# Patient Record
Sex: FEMALE | Race: WHITE | Employment: FULL TIME | URBAN - METROPOLITAN AREA
[De-identification: names, ages, dates, MRNs, and addresses within clinical notes are randomized per-mention and may not be internally consistent; named-entity substitution may affect disease eponyms.]

---

## 2017-02-03 PROBLEM — M79.661 PAIN IN RIGHT LOWER LEG: Status: ACTIVE | Noted: 2017-02-03

## 2017-08-14 ENCOUNTER — HOSPITAL ENCOUNTER (EMERGENCY)
Facility: HOSPITAL | Age: 57
Discharge: HOME | End: 2017-08-15
Attending: EMERGENCY MEDICINE
Payer: COMMERCIAL

## 2017-08-14 ENCOUNTER — APPOINTMENT (OUTPATIENT)
Dept: GENERAL RADIOLOGY | Facility: HOSPITAL | Age: 57
End: 2017-08-14
Payer: COMMERCIAL

## 2017-08-14 ENCOUNTER — APPOINTMENT (OUTPATIENT)
Dept: CT IMAGING | Facility: HOSPITAL | Age: 57
End: 2017-08-14
Attending: EMERGENCY MEDICINE
Payer: COMMERCIAL

## 2017-08-14 DIAGNOSIS — R42 VERTIGO: Primary | ICD-10-CM

## 2017-08-14 DIAGNOSIS — R07.89 CHEST PAIN, ATYPICAL: ICD-10-CM

## 2017-08-14 LAB
ALBUMIN SERPL-MCNC: 3.8 G/DL (ref 3.5–4.8)
ALP LIVER SERPL-CCNC: 108 U/L (ref 46–118)
ALT SERPL-CCNC: 24 U/L (ref 14–54)
AST SERPL-CCNC: 15 U/L (ref 15–41)
BASOPHILS # BLD AUTO: 0.04 X10(3) UL (ref 0–0.1)
BASOPHILS NFR BLD AUTO: 0.5 %
BILIRUB SERPL-MCNC: 0.5 MG/DL (ref 0.1–2)
BUN BLD-MCNC: 16 MG/DL (ref 8–20)
CALCIUM BLD-MCNC: 9.4 MG/DL (ref 8.3–10.3)
CHLORIDE: 105 MMOL/L (ref 101–111)
CO2: 27 MMOL/L (ref 22–32)
CREAT BLD-MCNC: 1.01 MG/DL (ref 0.55–1.02)
EOSINOPHIL # BLD AUTO: 0.12 X10(3) UL (ref 0–0.3)
EOSINOPHIL NFR BLD AUTO: 1.6 %
ERYTHROCYTE [DISTWIDTH] IN BLOOD BY AUTOMATED COUNT: 12.6 % (ref 11.5–16)
GLUCOSE BLD-MCNC: 99 MG/DL (ref 70–99)
HCT VFR BLD AUTO: 44.6 % (ref 34–50)
HGB BLD-MCNC: 15.2 G/DL (ref 12–16)
IMMATURE GRANULOCYTE COUNT: 0.02 X10(3) UL (ref 0–1)
IMMATURE GRANULOCYTE RATIO %: 0.3 %
LIPASE: 162 U/L (ref 73–393)
LYMPHOCYTES # BLD AUTO: 1.9 X10(3) UL (ref 0.9–4)
LYMPHOCYTES NFR BLD AUTO: 24.8 %
M PROTEIN MFR SERPL ELPH: 7.3 G/DL (ref 6.1–8.3)
MCH RBC QN AUTO: 30.5 PG (ref 27–33.2)
MCHC RBC AUTO-ENTMCNC: 34.1 G/DL (ref 31–37)
MCV RBC AUTO: 89.6 FL (ref 81–100)
MONOCYTES # BLD AUTO: 0.56 X10(3) UL (ref 0.1–0.6)
MONOCYTES NFR BLD AUTO: 7.3 %
NEUTROPHIL ABS PRELIM: 5.02 X10 (3) UL (ref 1.3–6.7)
NEUTROPHILS # BLD AUTO: 5.02 X10(3) UL (ref 1.3–6.7)
NEUTROPHILS NFR BLD AUTO: 65.5 %
PLATELET # BLD AUTO: 238 10(3)UL (ref 150–450)
POTASSIUM SERPL-SCNC: 3.8 MMOL/L (ref 3.6–5.1)
RBC # BLD AUTO: 4.98 X10(6)UL (ref 3.8–5.1)
RED CELL DISTRIBUTION WIDTH-SD: 41.2 FL (ref 35.1–46.3)
SODIUM SERPL-SCNC: 140 MMOL/L (ref 136–144)
TROPONIN: <0.046 NG/ML (ref ?–0.05)
WBC # BLD AUTO: 7.7 X10(3) UL (ref 4–13)

## 2017-08-14 PROCEDURE — 80053 COMPREHEN METABOLIC PANEL: CPT

## 2017-08-14 PROCEDURE — 93010 ELECTROCARDIOGRAM REPORT: CPT

## 2017-08-14 PROCEDURE — 83690 ASSAY OF LIPASE: CPT | Performed by: PHYSICIAN ASSISTANT

## 2017-08-14 PROCEDURE — 71010 XR CHEST AP PORTABLE  (CPT=71010): CPT

## 2017-08-14 PROCEDURE — 99285 EMERGENCY DEPT VISIT HI MDM: CPT

## 2017-08-14 PROCEDURE — 93005 ELECTROCARDIOGRAM TRACING: CPT

## 2017-08-14 PROCEDURE — 70450 CT HEAD/BRAIN W/O DYE: CPT | Performed by: EMERGENCY MEDICINE

## 2017-08-14 PROCEDURE — 71010 XR CHEST AP PORTABLE  (CPT=71010): CPT | Performed by: EMERGENCY MEDICINE

## 2017-08-14 PROCEDURE — 80053 COMPREHEN METABOLIC PANEL: CPT | Performed by: EMERGENCY MEDICINE

## 2017-08-14 PROCEDURE — 96372 THER/PROPH/DIAG INJ SC/IM: CPT

## 2017-08-14 PROCEDURE — 96374 THER/PROPH/DIAG INJ IV PUSH: CPT

## 2017-08-14 PROCEDURE — 84484 ASSAY OF TROPONIN QUANT: CPT | Performed by: EMERGENCY MEDICINE

## 2017-08-14 PROCEDURE — 84484 ASSAY OF TROPONIN QUANT: CPT

## 2017-08-14 PROCEDURE — 85025 COMPLETE CBC W/AUTO DIFF WBC: CPT | Performed by: EMERGENCY MEDICINE

## 2017-08-14 PROCEDURE — 85025 COMPLETE CBC W/AUTO DIFF WBC: CPT

## 2017-08-14 PROCEDURE — 70496 CT ANGIOGRAPHY HEAD: CPT | Performed by: EMERGENCY MEDICINE

## 2017-08-14 RX ORDER — DICYCLOMINE HYDROCHLORIDE 10 MG/ML
20 INJECTION INTRAMUSCULAR ONCE
Status: COMPLETED | OUTPATIENT
Start: 2017-08-14 | End: 2017-08-14

## 2017-08-14 RX ORDER — ONDANSETRON 2 MG/ML
4 INJECTION INTRAMUSCULAR; INTRAVENOUS ONCE
Status: COMPLETED | OUTPATIENT
Start: 2017-08-14 | End: 2017-08-14

## 2017-08-14 RX ORDER — MECLIZINE HYDROCHLORIDE 25 MG/1
25 TABLET ORAL ONCE
Status: COMPLETED | OUTPATIENT
Start: 2017-08-14 | End: 2017-08-14

## 2017-08-14 RX ORDER — LORAZEPAM 2 MG/ML
0.5 INJECTION INTRAMUSCULAR ONCE
Status: DISCONTINUED | OUTPATIENT
Start: 2017-08-14 | End: 2017-08-15

## 2017-08-15 VITALS
WEIGHT: 200 LBS | DIASTOLIC BLOOD PRESSURE: 62 MMHG | BODY MASS INDEX: 32 KG/M2 | SYSTOLIC BLOOD PRESSURE: 135 MMHG | HEART RATE: 67 BPM | RESPIRATION RATE: 14 BRPM | OXYGEN SATURATION: 98 %

## 2017-08-15 LAB
ATRIAL RATE: 56 BPM
ATRIAL RATE: 68 BPM
P AXIS: 54 DEGREES
P AXIS: 57 DEGREES
P-R INTERVAL: 140 MS
P-R INTERVAL: 142 MS
Q-T INTERVAL: 438 MS
Q-T INTERVAL: 450 MS
QRS DURATION: 76 MS
QRS DURATION: 86 MS
QTC CALCULATION (BEZET): 434 MS
QTC CALCULATION (BEZET): 465 MS
R AXIS: 28 DEGREES
R AXIS: 37 DEGREES
T AXIS: 34 DEGREES
T AXIS: 41 DEGREES
TROPONIN: <0.046 NG/ML (ref ?–0.05)
VENTRICULAR RATE: 56 BPM
VENTRICULAR RATE: 68 BPM

## 2017-08-15 RX ORDER — MECLIZINE HYDROCHLORIDE 25 MG/1
25 TABLET ORAL 3 TIMES DAILY PRN
Qty: 20 TABLET | Refills: 0 | Status: SHIPPED | OUTPATIENT
Start: 2017-08-15 | End: 2018-01-25 | Stop reason: ALTCHOICE

## 2017-08-15 RX ORDER — ONDANSETRON 4 MG/1
4 TABLET, ORALLY DISINTEGRATING ORAL EVERY 4 HOURS PRN
Qty: 20 TABLET | Refills: 0 | Status: SHIPPED | OUTPATIENT
Start: 2017-08-15 | End: 2017-08-20

## 2017-08-15 NOTE — ED INITIAL ASSESSMENT (HPI)
56YF c/c of dizziness and cp pt state she started feeling around 1700 pt state since then she started having vomiting and having chest pain radiating down her L arm

## 2017-08-15 NOTE — ED PROVIDER NOTES
Patient seen and examined with the physician assistant. Patient described to me positional vertigo so she would vomiting which started around 5 PM.  Intermittent chest pains lasting less than 1 minute that would occur every few minutes.   Constant jaw pain William Finch MD on 8/14/2017 at 21:59     Approved by: Lynn Oppenheim, MD            Xr Chest Ap Portable  (cpt=71010)    Result Date: 8/14/2017  PROCEDURE:  XR CHEST AP PORTABLE (CPT=71010)  TECHNIQUE:  AP chest radiograph was obtained.   COMPARISON:  [facility] An anterior communicating artery is seen. The branches of the anterior cerebral and middle cerebral arteries are unremarkable. A small right posterior  communicating artery is seen along with a small left posterior communicating artery.   The branches of discharge, that an emergency medical condition was not or was no longer present. There was no indication for further evaluation, treatment or admission on an emergency basis.   Comprehensive verbal and written discharge and follow-up instructions were prov

## 2017-08-15 NOTE — ED PROVIDER NOTES
Patient Seen in: [facility] Emergency Department    History   Patient presents with:  Chest Pain Angina (cardiovascular)    Stated Complaint: cp    HPI    CHIEF COMPLAINT: Nausea, vomiting, chest tightness, dizziness    HISTORY OF PRESENT ILLNESS: P D of right index finger   • AR (allergic rhinitis)    • Bronchospasm    • Esophageal reflux    • GERD     EGD 2007   • Osteoarthrosis, unspecified whether generalized or localized, unspecified site     right hip   • PONV (postoperative nausea and vomiting) stated complaint: cp  Other systems are as noted in HPI. Constitutional and vital signs reviewed. All other systems reviewed and negative except as noted above. PSFH elements reviewed from today and agreed except as otherwise stated in HPI.     Phy -----------         ------                     CBC W/ DIFFERENTIAL[[id number]]          Normal              Final result                 Please view results for these tests on the individual orders.    39 Yfn Bynum needed for Dizziness., Normal, Disp-20 tablet, R-0    ondansetron 4 MG Oral Tablet Dispersible  Take 1 tablet (4 mg total) by mouth every 4 (four) hours as needed for Nausea., Script printed, Disp-20 tablet, R-0

## 2017-12-22 PROCEDURE — 88377 M/PHMTRC ALYS ISHQUANT/SEMIQ: CPT | Performed by: RADIOLOGY

## 2017-12-22 PROCEDURE — 88305 TISSUE EXAM BY PATHOLOGIST: CPT | Performed by: RADIOLOGY

## 2017-12-22 PROCEDURE — 88360 TUMOR IMMUNOHISTOCHEM/MANUAL: CPT | Performed by: RADIOLOGY

## 2018-02-12 ENCOUNTER — TELEPHONE (OUTPATIENT)
Dept: MAMMOGRAPHY | Facility: HOSPITAL | Age: 58
End: 2018-02-12

## 2018-02-12 NOTE — TELEPHONE ENCOUNTER
Spoke with Tierra Kim regarding [place] Lymph Node mapping to be done in nuclear medicine department before breast surgery scheduled for 2/15/18 with Danna. Pt to be transported to [address] [place] [address] by [facility] through [employer].  Will make every effort

## 2018-02-15 ENCOUNTER — ANESTHESIA (OUTPATIENT)
Dept: SURGERY | Facility: HOSPITAL | Age: 58
End: 2018-02-15

## 2018-02-15 ENCOUNTER — HOSPITAL ENCOUNTER (OUTPATIENT)
Facility: HOSPITAL | Age: 58
Setting detail: OBSERVATION
Discharge: HOME | End: 2018-02-17
Attending: SURGERY | Admitting: SURGERY
Payer: COMMERCIAL

## 2018-02-15 ENCOUNTER — ANESTHESIA EVENT (OUTPATIENT)
Dept: SURGERY | Facility: HOSPITAL | Age: 58
End: 2018-02-15

## 2018-02-15 ENCOUNTER — HOSPITAL ENCOUNTER (OUTPATIENT)
Dept: NUCLEAR MEDICINE | Facility: HOSPITAL | Age: 58
Discharge: HOME | End: 2018-02-15
Attending: SURGERY
Payer: COMMERCIAL

## 2018-02-15 DIAGNOSIS — C50.919 INFILTRATING DUCTAL CARCINOMA OF FEMALE BREAST, UNSPECIFIED LATERALITY (HCC): ICD-10-CM

## 2018-02-15 DIAGNOSIS — C50.911 BILATERAL MALIGNANT NEOPLASM OF BREAST IN FEMALE, UNSPECIFIED ESTROGEN RECEPTOR STATUS, UNSPECIFIED SITE OF BREAST (HCC): Primary | ICD-10-CM

## 2018-02-15 DIAGNOSIS — C50.912 BILATERAL MALIGNANT NEOPLASM OF BREAST IN FEMALE, UNSPECIFIED ESTROGEN RECEPTOR STATUS, UNSPECIFIED SITE OF BREAST (HCC): Primary | ICD-10-CM

## 2018-02-15 PROCEDURE — 88307 TISSUE EXAM BY PATHOLOGIST: CPT | Performed by: SURGERY

## 2018-02-15 PROCEDURE — 88331 PATH CONSLTJ SURG 1 BLK 1SPC: CPT | Performed by: SURGERY

## 2018-02-15 PROCEDURE — 88305 TISSUE EXAM BY PATHOLOGIST: CPT | Performed by: SURGERY

## 2018-02-15 PROCEDURE — 88360 TUMOR IMMUNOHISTOCHEM/MANUAL: CPT | Performed by: SURGERY

## 2018-02-15 PROCEDURE — 07B50ZX EXCISION OF RIGHT AXILLARY LYMPHATIC, OPEN APPROACH, DIAGNOSTIC: ICD-10-PCS | Performed by: SURGERY

## 2018-02-15 PROCEDURE — 0HTV0ZZ RESECTION OF BILATERAL BREAST, OPEN APPROACH: ICD-10-PCS | Performed by: SURGERY

## 2018-02-15 PROCEDURE — 88332 PATH CONSLTJ SURG EA ADD BLK: CPT | Performed by: SURGERY

## 2018-02-15 PROCEDURE — A4216 STERILE WATER/SALINE, 10 ML: HCPCS

## 2018-02-15 PROCEDURE — 78195 LYMPH SYSTEM IMAGING: CPT | Performed by: SURGERY

## 2018-02-15 PROCEDURE — 0HUV0KZ SUPPLEMENT BILATERAL BREAST WITH NONAUTOLOGOUS TISSUE SUBSTITUTE, OPEN APPROACH: ICD-10-PCS | Performed by: PLASTIC SURGERY

## 2018-02-15 PROCEDURE — 0HRV0JZ REPLACEMENT OF BILATERAL BREAST WITH SYNTHETIC SUBSTITUTE, OPEN APPROACH: ICD-10-PCS | Performed by: PLASTIC SURGERY

## 2018-02-15 DEVICE — MATRIX TISS 20X16CM ALDRM THK: Type: IMPLANTABLE DEVICE | Site: BREAST | Status: FUNCTIONAL

## 2018-02-15 DEVICE — IMPLANTABLE DEVICE: Type: IMPLANTABLE DEVICE | Site: BREAST | Status: FUNCTIONAL

## 2018-02-15 RX ORDER — OXYCODONE HYDROCHLORIDE AND ACETAMINOPHEN 5; 325 MG/1; MG/1
1 TABLET ORAL EVERY 4 HOURS PRN
Status: DISCONTINUED | OUTPATIENT
Start: 2018-02-15 | End: 2018-02-17

## 2018-02-15 RX ORDER — MIDAZOLAM HYDROCHLORIDE 1 MG/ML
1 INJECTION INTRAMUSCULAR; INTRAVENOUS EVERY 5 MIN PRN
Status: ACTIVE | OUTPATIENT
Start: 2018-02-15 | End: 2018-02-15

## 2018-02-15 RX ORDER — OXYCODONE HYDROCHLORIDE AND ACETAMINOPHEN 5; 325 MG/1; MG/1
2 TABLET ORAL EVERY 4 HOURS PRN
Status: DISCONTINUED | OUTPATIENT
Start: 2018-02-15 | End: 2018-02-17

## 2018-02-15 RX ORDER — MIDAZOLAM HYDROCHLORIDE 1 MG/ML
INJECTION INTRAMUSCULAR; INTRAVENOUS
Status: COMPLETED
Start: 2018-02-15 | End: 2018-02-15

## 2018-02-15 RX ORDER — MORPHINE SULFATE 2 MG/ML
4 INJECTION, SOLUTION INTRAMUSCULAR; INTRAVENOUS EVERY 2 HOUR PRN
Status: DISCONTINUED | OUTPATIENT
Start: 2018-02-15 | End: 2018-02-17

## 2018-02-15 RX ORDER — CEFAZOLIN SODIUM/WATER 2 G/20 ML
2 SYRINGE (ML) INTRAVENOUS ONCE
Status: DISCONTINUED | OUTPATIENT
Start: 2018-02-15 | End: 2018-02-15 | Stop reason: HOSPADM

## 2018-02-15 RX ORDER — DIPHENHYDRAMINE HYDROCHLORIDE 50 MG/ML
12.5 INJECTION INTRAMUSCULAR; INTRAVENOUS AS NEEDED
Status: ACTIVE | OUTPATIENT
Start: 2018-02-15 | End: 2018-02-15

## 2018-02-15 RX ORDER — SODIUM CHLORIDE, SODIUM LACTATE, POTASSIUM CHLORIDE, CALCIUM CHLORIDE 600; 310; 30; 20 MG/100ML; MG/100ML; MG/100ML; MG/100ML
INJECTION, SOLUTION INTRAVENOUS CONTINUOUS
Status: DISCONTINUED | OUTPATIENT
Start: 2018-02-15 | End: 2018-02-16

## 2018-02-15 RX ORDER — DOCUSATE SODIUM 100 MG/1
100 CAPSULE, LIQUID FILLED ORAL 2 TIMES DAILY
Status: DISCONTINUED | OUTPATIENT
Start: 2018-02-15 | End: 2018-02-17

## 2018-02-15 RX ORDER — MORPHINE SULFATE 2 MG/ML
INJECTION, SOLUTION INTRAMUSCULAR; INTRAVENOUS
Status: DISPENSED
Start: 2018-02-15 | End: 2018-02-16

## 2018-02-15 RX ORDER — ONDANSETRON 2 MG/ML
INJECTION INTRAMUSCULAR; INTRAVENOUS
Status: COMPLETED
Start: 2018-02-15 | End: 2018-02-15

## 2018-02-15 RX ORDER — BUPIVACAINE HYDROCHLORIDE AND EPINEPHRINE 5; 5 MG/ML; UG/ML
INJECTION, SOLUTION EPIDURAL; INTRACAUDAL; PERINEURAL AS NEEDED
Status: DISCONTINUED | OUTPATIENT
Start: 2018-02-15 | End: 2018-02-15 | Stop reason: HOSPADM

## 2018-02-15 RX ORDER — SODIUM CHLORIDE, SODIUM LACTATE, POTASSIUM CHLORIDE, CALCIUM CHLORIDE 600; 310; 30; 20 MG/100ML; MG/100ML; MG/100ML; MG/100ML
INJECTION, SOLUTION INTRAVENOUS CONTINUOUS
Status: DISCONTINUED | OUTPATIENT
Start: 2018-02-15 | End: 2018-02-15 | Stop reason: HOSPADM

## 2018-02-15 RX ORDER — ONDANSETRON 2 MG/ML
4 INJECTION INTRAMUSCULAR; INTRAVENOUS AS NEEDED
Status: ACTIVE | OUTPATIENT
Start: 2018-02-15 | End: 2018-02-15

## 2018-02-15 RX ORDER — ONDANSETRON 2 MG/ML
4 INJECTION INTRAMUSCULAR; INTRAVENOUS EVERY 6 HOURS PRN
Status: ACTIVE | OUTPATIENT
Start: 2018-02-15 | End: 2018-02-16

## 2018-02-15 RX ORDER — HEPARIN SODIUM 5000 [USP'U]/ML
5000 INJECTION, SOLUTION INTRAVENOUS; SUBCUTANEOUS ONCE
Status: COMPLETED | OUTPATIENT
Start: 2018-02-15 | End: 2018-02-15

## 2018-02-15 RX ORDER — ACETAMINOPHEN 325 MG/1
650 TABLET ORAL EVERY 4 HOURS PRN
Status: DISCONTINUED | OUTPATIENT
Start: 2018-02-15 | End: 2018-02-16

## 2018-02-15 RX ORDER — LIDOCAINE AND PRILOCAINE 25; 25 MG/G; MG/G
CREAM TOPICAL ONCE
Status: COMPLETED | OUTPATIENT
Start: 2018-02-15 | End: 2018-02-15

## 2018-02-15 RX ORDER — MEPERIDINE HYDROCHLORIDE 25 MG/ML
12.5 INJECTION INTRAMUSCULAR; INTRAVENOUS; SUBCUTANEOUS AS NEEDED
Status: DISCONTINUED | OUTPATIENT
Start: 2018-02-15 | End: 2018-02-15 | Stop reason: HOSPADM

## 2018-02-15 RX ORDER — NALOXONE HYDROCHLORIDE 0.4 MG/ML
80 INJECTION, SOLUTION INTRAMUSCULAR; INTRAVENOUS; SUBCUTANEOUS AS NEEDED
Status: ACTIVE | OUTPATIENT
Start: 2018-02-15 | End: 2018-02-15

## 2018-02-15 RX ORDER — BISACODYL 10 MG
10 SUPPOSITORY, RECTAL RECTAL
Status: DISCONTINUED | OUTPATIENT
Start: 2018-02-15 | End: 2018-02-17

## 2018-02-15 RX ORDER — ISOSULFAN BLUE 50 MG/5ML
INJECTION, SOLUTION SUBCUTANEOUS AS NEEDED
Status: DISCONTINUED | OUTPATIENT
Start: 2018-02-15 | End: 2018-02-15 | Stop reason: HOSPADM

## 2018-02-15 RX ORDER — MORPHINE SULFATE 4 MG/ML
2 INJECTION, SOLUTION INTRAMUSCULAR; INTRAVENOUS EVERY 5 MIN PRN
Status: DISCONTINUED | OUTPATIENT
Start: 2018-02-15 | End: 2018-02-15 | Stop reason: HOSPADM

## 2018-02-15 RX ORDER — CEFAZOLIN SODIUM/WATER 2 G/20 ML
2 SYRINGE (ML) INTRAVENOUS EVERY 8 HOURS
Status: COMPLETED | OUTPATIENT
Start: 2018-02-15 | End: 2018-02-16

## 2018-02-15 RX ORDER — SODIUM CHLORIDE 9 MG/ML
INJECTION, SOLUTION INTRAVENOUS CONTINUOUS
Status: DISCONTINUED | OUTPATIENT
Start: 2018-02-15 | End: 2018-02-17

## 2018-02-15 RX ORDER — MEPERIDINE HYDROCHLORIDE 25 MG/ML
INJECTION INTRAMUSCULAR; INTRAVENOUS; SUBCUTANEOUS
Status: COMPLETED
Start: 2018-02-15 | End: 2018-02-15

## 2018-02-15 RX ORDER — MORPHINE SULFATE 2 MG/ML
INJECTION, SOLUTION INTRAMUSCULAR; INTRAVENOUS
Status: COMPLETED
Start: 2018-02-15 | End: 2018-02-15

## 2018-02-15 RX ORDER — LIDOCAINE AND PRILOCAINE 25; 25 MG/G; MG/G
CREAM TOPICAL
Status: COMPLETED
Start: 2018-02-15 | End: 2018-02-15

## 2018-02-15 RX ORDER — DICYCLOMINE HYDROCHLORIDE 10 MG/1
10 CAPSULE ORAL 3 TIMES DAILY PRN
Status: DISCONTINUED | OUTPATIENT
Start: 2018-02-15 | End: 2018-02-17

## 2018-02-15 RX ORDER — HEPARIN SODIUM 5000 [USP'U]/ML
5000 INJECTION, SOLUTION INTRAVENOUS; SUBCUTANEOUS EVERY 8 HOURS SCHEDULED
Status: DISCONTINUED | OUTPATIENT
Start: 2018-02-16 | End: 2018-02-17

## 2018-02-15 RX ORDER — MORPHINE SULFATE 2 MG/ML
2 INJECTION, SOLUTION INTRAMUSCULAR; INTRAVENOUS EVERY 2 HOUR PRN
Status: DISCONTINUED | OUTPATIENT
Start: 2018-02-15 | End: 2018-02-17

## 2018-02-15 RX ORDER — LIDOCAINE AND PRILOCAINE 25; 25 MG/G; MG/G
CREAM TOPICAL
Status: DISPENSED
Start: 2018-02-15 | End: 2018-02-15

## 2018-02-15 RX ORDER — DIAZEPAM 5 MG/1
5 TABLET ORAL AS NEEDED
Status: DISCONTINUED | OUTPATIENT
Start: 2018-02-15 | End: 2018-02-15 | Stop reason: HOSPADM

## 2018-02-15 RX ORDER — DEXAMETHASONE SODIUM PHOSPHATE 4 MG/ML
4 VIAL (ML) INJECTION AS NEEDED
Status: ACTIVE | OUTPATIENT
Start: 2018-02-15 | End: 2018-02-15

## 2018-02-15 RX ORDER — MIDAZOLAM HYDROCHLORIDE 1 MG/ML
1 INJECTION INTRAMUSCULAR; INTRAVENOUS EVERY 5 MIN PRN
Status: DISCONTINUED | OUTPATIENT
Start: 2018-02-15 | End: 2018-02-15 | Stop reason: HOSPADM

## 2018-02-15 NOTE — ANESTHESIA PREPROCEDURE EVALUATION
PRE-OP EVALUATION    Patient Name: Ean Sigala    Pre-op Diagnosis: Infiltrating ductal carcinoma of female breast, [facility] [C50.919]    Procedure(s):  BILATERAL TOTAL MASTECTOMY WITH RIGHT SENTINEL LYMPH NODE BIOPSY POSSIBLE AXI Pulmonary                           Neuro/Psych                                    Past Surgical History:  9/12/2013: DRAIN/INJECT LARGE JOINT/BURSA      Comment: Procedure: HIP INJECTION (PAIN);   Surgeon:                Justin Pimentel discussed with surgeon and patient.       Plan/risks discussed with: patient                Present on Admission:  **None**

## 2018-02-15 NOTE — OPERATIVE REPORT
Pre-Operative Diagnosis: Infiltrating ductal carcinoma of female breast, unspecified laterality ([facility] [C50.919]     Post-Operative Diagnosis: Infiltrating ductal carcinoma of female breast, unspecified laterality ([facility] [C50.919]     Procedure Performed:    PROCEDURE:  She was preoperatively marked in the standing upright position. Both inframammary folds as well as the midline were marked. Mastectomy incisions were marked.  The patient was then brought to the operating room, prepped and draped in the usu counts and instrument counts were correct at the end of the procedure.  She will be followed on the floor  postoperatively.

## 2018-02-15 NOTE — BRIEF OP NOTE
Pre-Operative Diagnosis: Infiltrating ductal carcinoma of female breast, unspecified laterality ([facility] [C50.919]     Post-Operative Diagnosis: Infiltrating ductal carcinoma of female breast, unspecified laterality ([facility] [C50.919]     Procedure Performed:

## 2018-02-15 NOTE — ANESTHESIA POSTPROCEDURE EVALUATION
[facility]    Jamel Riddle Patient Status:  Outpatient in a Bed   Age/Gender 61year old female MRN [id number]   [facility] SURGERY Attending Mounika Calhoun MD   Hosp Day # 0 PCP Tommy Rashid MD       Anesthesia Post-op Not

## 2018-02-15 NOTE — OPERATIVE REPORT
Pre-Operative Diagnosis: Infiltrating ductal carcinoma of female breast, right breast   Post-Operative Diagnosis: Infiltrating ductal carcinoma of female breast, right breast   Procedure Performed:   Procedure(s):  BILATERAL TOTAL MASTECTOMY WITH RIGHT SEN

## 2018-02-15 NOTE — BRIEF OP NOTE
Pre-Operative Diagnosis: Infiltrating ductal carcinoma of female breast, unspecified laterality ([facility] [C50.919]     Post-Operative Diagnosis: Infiltrating ductal carcinoma of female breast, unspecified laterality ([facility] [C50.919]     Procedure Performed PROCEDURE:  She was preoperatively marked in the standing upright position. Both inframammary folds as well as the midline were marked. Mastectomy incisions were marked.  The patient was then brought to the operating room, prepped and draped in the usua and instrument counts were correct at the end of the procedure.  She will be followed on the floor  postoperatively.

## 2018-02-15 NOTE — IMAGING NOTE
Assisted Nuc [facility] with [place] Lymph Node mapping of breast for SLN biopsy. Procedure explained and all questions answered. Pt verbalized understanding. Emotional support provided and pt tolerated procedure well with some discomfort.   Information given to

## 2018-02-15 NOTE — BRIEF OP NOTE
Pre-Operative Diagnosis: Infiltrating ductal carcinoma of female breast, right breast   Post-Operative Diagnosis: Infiltrating ductal carcinoma of female breast, right breast   Procedure Performed:   Procedure(s):  BILATERAL TOTAL MASTECTOMY WITH RIGHT

## 2018-02-15 NOTE — H&P
[facility] Mayola Passe Patient Status:  Outpatient in a Bed    1960 MRN [id number]   Location [facility] Attending Pedro Andres MD   Hosp Day # 0 PCP Madeleine Escobedo MD     Active Problems:    * No active

## 2018-02-16 LAB
25-HYDROXYVITAMIN D (TOTAL): 24.9 NG/ML (ref 30–100)
BASOPHILS # BLD AUTO: 0.02 X10(3) UL (ref 0–0.1)
BASOPHILS NFR BLD AUTO: 0.1 %
BUN BLD-MCNC: 12 MG/DL (ref 8–20)
CALCIUM BLD-MCNC: 8.3 MG/DL (ref 8.3–10.3)
CHLORIDE: 108 MMOL/L (ref 101–111)
CO2: 25 MMOL/L (ref 22–32)
CREAT BLD-MCNC: 0.96 MG/DL (ref 0.55–1.02)
EOSINOPHIL # BLD AUTO: 0 X10(3) UL (ref 0–0.3)
EOSINOPHIL NFR BLD AUTO: 0 %
ERYTHROCYTE [DISTWIDTH] IN BLOOD BY AUTOMATED COUNT: 13 % (ref 11.5–16)
GLUCOSE BLD-MCNC: 122 MG/DL (ref 70–99)
HAV IGM SER QL: 1.8 MG/DL (ref 1.7–3)
HCT VFR BLD AUTO: 35.4 % (ref 34–50)
HGB BLD-MCNC: 12.1 G/DL (ref 12–16)
IMMATURE GRANULOCYTE COUNT: 0.1 X10(3) UL (ref 0–1)
IMMATURE GRANULOCYTE RATIO %: 0.6 %
LYMPHOCYTES # BLD AUTO: 1.06 X10(3) UL (ref 0.9–4)
LYMPHOCYTES NFR BLD AUTO: 6.3 %
MCH RBC QN AUTO: 31.3 PG (ref 27–33.2)
MCHC RBC AUTO-ENTMCNC: 34.2 G/DL (ref 31–37)
MCV RBC AUTO: 91.5 FL (ref 81–100)
MONOCYTES # BLD AUTO: 0.95 X10(3) UL (ref 0.1–1)
MONOCYTES NFR BLD AUTO: 5.7 %
NEUTROPHIL ABS PRELIM: 14.67 X10 (3) UL (ref 1.3–6.7)
NEUTROPHILS # BLD AUTO: 14.67 X10(3) UL (ref 1.3–6.7)
NEUTROPHILS NFR BLD AUTO: 87.3 %
PLATELET # BLD AUTO: 194 10(3)UL (ref 150–450)
POTASSIUM SERPL-SCNC: 4.1 MMOL/L (ref 3.6–5.1)
RBC # BLD AUTO: 3.87 X10(6)UL (ref 3.8–5.1)
RED CELL DISTRIBUTION WIDTH-SD: 43.1 FL (ref 35.1–46.3)
SODIUM SERPL-SCNC: 140 MMOL/L (ref 136–144)
WBC # BLD AUTO: 16.8 X10(3) UL (ref 4–13)

## 2018-02-16 PROCEDURE — 85025 COMPLETE CBC W/AUTO DIFF WBC: CPT | Performed by: HOSPITALIST

## 2018-02-16 PROCEDURE — 83735 ASSAY OF MAGNESIUM: CPT | Performed by: HOSPITALIST

## 2018-02-16 PROCEDURE — 80048 BASIC METABOLIC PNL TOTAL CA: CPT | Performed by: HOSPITALIST

## 2018-02-16 PROCEDURE — 82306 VITAMIN D 25 HYDROXY: CPT | Performed by: HOSPITALIST

## 2018-02-16 RX ORDER — ONDANSETRON 4 MG/1
4 TABLET, FILM COATED ORAL EVERY 8 HOURS PRN
Qty: 20 TABLET | Refills: 2 | Status: SHIPPED | OUTPATIENT
Start: 2018-02-16 | End: 2018-03-03

## 2018-02-16 RX ORDER — CEFADROXIL 500 MG/1
500 CAPSULE ORAL 2 TIMES DAILY
Qty: 40 CAPSULE | Refills: 0 | Status: SHIPPED | OUTPATIENT
Start: 2018-02-16 | End: 2018-03-08

## 2018-02-16 RX ORDER — MAGNESIUM OXIDE 400 MG (241.3 MG MAGNESIUM) TABLET
400 TABLET ONCE
Status: COMPLETED | OUTPATIENT
Start: 2018-02-16 | End: 2018-02-16

## 2018-02-16 RX ORDER — CETIRIZINE HYDROCHLORIDE 5 MG/1
5 TABLET ORAL DAILY
Status: DISCONTINUED | OUTPATIENT
Start: 2018-02-16 | End: 2018-02-17

## 2018-02-16 RX ORDER — OXYCODONE HYDROCHLORIDE AND ACETAMINOPHEN 5; 325 MG/1; MG/1
1 TABLET ORAL EVERY 4 HOURS PRN
Qty: 30 TABLET | Refills: 1 | Status: SHIPPED | OUTPATIENT
Start: 2018-02-16 | End: 2018-02-26

## 2018-02-16 RX ORDER — ACETAMINOPHEN 500 MG
1000 TABLET ORAL EVERY 6 HOURS PRN
Status: DISCONTINUED | OUTPATIENT
Start: 2018-02-16 | End: 2018-02-17

## 2018-02-16 NOTE — PLAN OF CARE
Patient/Family Goals    • Patient/Family Long Term Goal Progressing        SAFETY ADULT - FALL    • Free from fall injury Progressing        SKIN/TISSUE INTEGRITY - ADULT    • Incision(s), wounds(s) or drain site(s) healing without S/S of infection Progres

## 2018-02-16 NOTE — PLAN OF CARE
Problem: PAIN - ADULT  Goal: Verbalizes/displays adequate comfort level or patient's stated pain goal  INTERVENTIONS:  - Encourage pt to monitor pain and request assistance  - Assess pain using appropriate pain scale  - Administer analgesics based on type room.   Safety precautions initiated. Bed in low position. Call light in reach. Patient A&OX4 with no neuro deficits noted. Patient will maintain oxygen saturation at/above 93% 2L NC O2. Jordan Dalton  SCD's applied, writing RN explained the benefits of wearing SCD

## 2018-02-16 NOTE — PROGRESS NOTES
[facility] hospitalist daily note  Patient was seen/examined on 2/16/18    S; pain is better controlled.  No nausea, no SOB    medicaitons in EPIC    PE;   02/16/18  1554   BP: 110/50   Pulse: 70   Resp: 18   Temp: 99.4 °F (37.4 °C)     Gen: awake, alert, no respi

## 2018-02-16 NOTE — PLAN OF CARE
Problem: PAIN - ADULT  Goal: Verbalizes/displays adequate comfort level or patient's stated pain goal  INTERVENTIONS:  - Encourage pt to monitor pain and request assistance  - Assess pain using appropriate pain scale  - Administer analgesics based on type Patient/Family Goals  Goal: Patient/Family Long Term Goal  Patient's Long Term Goal: Be able to compete again    Interventions:  - Eat healthy  - See additional Care Plan goals for specific interventions   Outcome: Progressing    Goal: Patient/Family Short

## 2018-02-16 NOTE — PROGRESS NOTES
Plastic Surgery PN    POD 1  No events o/n  +nausea  Pain controlled   Ambulating  Voided without difficulty     02/16/18  0700   BP: 113/48   Pulse: 75   Resp: 18   Temp: 98.6 °F (37 °C)         Intake/Output Summary (Last 24 hours) at 02/16/18 0817  Last

## 2018-02-16 NOTE — PAYOR COMM NOTE
--------------  ADMISSION REVIEW     Payor: [facility]  Subscriber #:  [id number]  Authorization Number: [id number]    Admit date: N/A  Admit time: N/A       Admitting Physician: Jovani Leger MD  Attending Physician:  Jovani Leger MD  Primary [facility]ici 12/30/2011, SpO2 99 %, not currently breastfeeding.     HPI    Review of Systems    Physical Exam    Assessment:  Right breast cancer    Plan:  Bilateral total mastectomy, right slnb and possible right alnd    Nghia Clay  2/15/2018[AM.1]    Electronically Given 1 mg Intravenous Leyda Norwood RN    2/15/2018 1600 Given 1 mg Intravenous Leyda Norwood RN      morphINE sulfate (PF) 2 MG/ML injection 4 mg     Date Action Dose Route User    2/15/2018 1915 Given 4 mg Intravenous Stan Bravo, RN      morphINE

## 2018-02-16 NOTE — OPERATIVE REPORT
[facility]    PATIENT'S NAME: Hilary Craig   ATTENDING PHYSICIAN: Isaías Pena M.D. OPERATING PHYSICIAN: Isaías Pena M.D.    PATIENT ACCOUNT#:   [de-identified]    LOCATION:  [address] [place] [address]  MEDICAL RECORD #:   [id number]       DATE OF BIRTH:  10 injected 5 mL of Lymphazurin blue dye into the right breast at the subareolar area. Breast massage was carried out for 5 minutes. I made an incision in the right axilla, incision made with scalpel and carried down through the clavipectoral fascia.   One s

## 2018-02-16 NOTE — PROGRESS NOTES
NURSING ADMISSION NOTE      Patient admitted via Bed. Oriented to room. Safety precautions initiated. Bed in low position. Call light in reach. Admission database completed. Family at bedside. Primary RN updated.

## 2018-02-16 NOTE — CONSULTS
[facility] hospitalist initial consult note  Patient was seen/examined on 2/15/18    PCP; Ifeanyi Abreu MD  Consulted at the request of Dr. Aravind Javed  Reason for consult medical co-management  HPI 62 yo female with hx of GERD (per patient PPI makes it worse, ne Problem Relation Age of Onset   • lung cancer [OTHER] Father    • brain cancer [OTHER] Other    • stomach cancer [OTHER] Maternal Grandmother    • hystercetomy [OTHER] Mother        Social History  Social History   Marital status: Single  Spouse name: N/ patient PPI does not work and asking for bentyl.  Basilia Mora MD  [facility]  [phone]

## 2018-02-17 VITALS
HEIGHT: 66 IN | OXYGEN SATURATION: 96 % | BODY MASS INDEX: 32.2 KG/M2 | HEART RATE: 81 BPM | WEIGHT: 200.38 LBS | TEMPERATURE: 99 F | RESPIRATION RATE: 16 BRPM | SYSTOLIC BLOOD PRESSURE: 107 MMHG | DIASTOLIC BLOOD PRESSURE: 49 MMHG

## 2018-02-17 LAB — HAV IGM SER QL: 1.8 MG/DL (ref 1.7–3)

## 2018-02-17 PROCEDURE — 83735 ASSAY OF MAGNESIUM: CPT | Performed by: HOSPITALIST

## 2018-02-17 RX ORDER — PSEUDOEPHEDRINE HCL 30 MG
100 TABLET ORAL 2 TIMES DAILY PRN
Qty: 20 CAPSULE | Refills: 0 | Status: SHIPPED | OUTPATIENT
Start: 2018-02-17 | End: 2018-11-06

## 2018-02-17 NOTE — DISCHARGE SUMMARY
[facility]  Discharge Summary    Stuart Joan Patient Status:  Observation    1960 MRN [id number]   [facility] 3NW-A Attending José Delgado MD   Hosp Day # 0 PCP Odessa Mcginnis MD     Date of Admission: 2/15/2018 EPIC    Disposition: Home or Self Care    Discharge Condition: alive    Discharge Medications: Current Discharge Medication List    START taking these medications    docusate sodium 100 MG Oral Cap  Take 100 mg by mouth 2 (two) times daily as needed for co drive when taking pain medications  Do not exceed 4 grams acetaminophen within 24 hours       Ebony Murillo  2/17/2018  1:32 PM

## 2018-02-17 NOTE — PROGRESS NOTES
Written and verbal discharge instructions given to patient and family, verbalize understanding. IV discontinued in LLA angio-cath tip intact, site free from redness, swelling, or drainage, patient denies pain at site. Dressing applied.   Prescription given

## 2018-03-06 PROBLEM — Z17.0 MALIGNANT NEOPLASM OF RIGHT BREAST IN FEMALE, ESTROGEN RECEPTOR POSITIVE, UNSPECIFIED SITE OF BREAST (HCC): Status: ACTIVE | Noted: 2018-03-06

## 2018-03-06 PROBLEM — C50.911 MALIGNANT NEOPLASM OF RIGHT BREAST IN FEMALE, ESTROGEN RECEPTOR POSITIVE, UNSPECIFIED SITE OF BREAST (HCC): Status: ACTIVE | Noted: 2018-03-06

## 2018-04-24 PROBLEM — Z51.11 CHEMOTHERAPY MANAGEMENT, ENCOUNTER FOR: Status: ACTIVE | Noted: 2018-04-24

## 2018-06-05 PROBLEM — G62.9 PERIPHERAL NEUROPATHY: Status: ACTIVE | Noted: 2018-06-05

## 2018-06-05 PROBLEM — R19.7 DIARRHEA: Status: ACTIVE | Noted: 2018-06-05

## 2018-06-05 PROBLEM — H04.203 TEARING EYES: Status: ACTIVE | Noted: 2018-06-05

## 2018-10-23 PROCEDURE — 88305 TISSUE EXAM BY PATHOLOGIST: CPT | Performed by: INTERNAL MEDICINE

## 2019-05-21 PROBLEM — E66.9 OBESITY (BMI 30-39.9): Status: ACTIVE | Noted: 2019-05-21

## 2019-05-21 PROBLEM — E88.81 INSULIN RESISTANCE: Status: ACTIVE | Noted: 2019-05-21

## 2019-05-21 PROCEDURE — 82010 KETONE BODYS QUAN: CPT | Performed by: INTERNAL MEDICINE

## 2019-05-21 PROCEDURE — 84482 T3 REVERSE: CPT | Performed by: INTERNAL MEDICINE

## 2019-05-21 PROCEDURE — 82397 CHEMILUMINESCENT ASSAY: CPT | Performed by: INTERNAL MEDICINE

## 2019-05-21 PROCEDURE — 83525 ASSAY OF INSULIN: CPT | Performed by: INTERNAL MEDICINE

## 2019-06-20 PROBLEM — E78.5 DYSLIPIDEMIA: Status: ACTIVE | Noted: 2019-06-20

## 2019-06-26 PROCEDURE — 87624 HPV HI-RISK TYP POOLED RSLT: CPT | Performed by: OBSTETRICS & GYNECOLOGY

## 2019-06-26 PROCEDURE — 88175 CYTOPATH C/V AUTO FLUID REDO: CPT | Performed by: OBSTETRICS & GYNECOLOGY

## 2019-09-10 PROCEDURE — 82397 CHEMILUMINESCENT ASSAY: CPT | Performed by: INTERNAL MEDICINE

## 2019-09-10 PROCEDURE — 82172 ASSAY OF APOLIPOPROTEIN: CPT | Performed by: INTERNAL MEDICINE

## 2019-09-10 PROCEDURE — 80061 LIPID PANEL: CPT | Performed by: INTERNAL MEDICINE

## 2019-09-10 PROCEDURE — 83525 ASSAY OF INSULIN: CPT | Performed by: INTERNAL MEDICINE

## 2019-09-10 PROCEDURE — 83695 ASSAY OF LIPOPROTEIN(A): CPT | Performed by: INTERNAL MEDICINE

## 2019-09-10 PROCEDURE — 83704 LIPOPROTEIN BLD QUAN PART: CPT | Performed by: INTERNAL MEDICINE

## (undated) DEVICE — 3M™ TEGADERM™ TRANSPARENT FILM DRESSING, 1626W, 4 IN X 4-3/4 IN (10 CM X 12 CM), 50 EACH/CARTON, 4 CARTON/CASE: Brand: 3M™ TEGADERM™

## (undated) DEVICE — GOWN,SIRUS,FABRIC-REINFORCED,X-LARGE: Brand: MEDLINE

## (undated) DEVICE — SYRINGE 50ML LL TIP

## (undated) DEVICE — GLOVE ORTHO ALOETOUCH SZ 6-1/2

## (undated) DEVICE — PROXIMATE RH ROTATING HEAD SKIN STAPLERS (35 WIDE) CONTAINS 35 STAINLESS STEEL STAPLES: Brand: PROXIMATE

## (undated) DEVICE — SUTURE ETHILON 3-0 PS-1

## (undated) DEVICE — REDUCER FITTING (NON-STERILE) 7/8 IN (22MM) TO 1/4 IN (6.4MM) WITH 2 IN (5.1CM) CUFF

## (undated) DEVICE — UNDERPAD INCNT 30X30IN PP HVY

## (undated) DEVICE — DRAPE HALF 40X58 DYNJP2410

## (undated) DEVICE — NON-ADHERENT PAD PREPACK: Brand: TELFA

## (undated) DEVICE — LAMINECTOMY ARM CRADLE FOAM POSITIONER: Brand: CARDINAL HEALTH

## (undated) DEVICE — SUTURE ETHILON 3-0 PS-2

## (undated) DEVICE — CSTM UNIVERSAL DRAPE PK: Brand: MEDLINE INDUSTRIES, INC.

## (undated) DEVICE — SUTURE MONOCRYL 4-0 PS-2

## (undated) DEVICE — MEDI-VAC SUCTION HANDLE REGULAR CAPACITY: Brand: CARDINAL HEALTH

## (undated) DEVICE — STERILE (15.2 X 244CM) POLYETHYLENE TELESCOPICALLY-FOLDED COVER WITH ATTACHED (3CM) NEOGUARD™ TIP: Brand: SURGI-TIP TRANSDUCER COVER

## (undated) DEVICE — MEDI-VAC YANKAUER SUCTION HANDLE W/BULBOUS TIP: Brand: CARDINAL HEALTH

## (undated) DEVICE — KENDALL SCD EXPRESS SLEEVES, KNEE LENGTH, MEDIUM: Brand: KENDALL SCD

## (undated) DEVICE — SOL  .9 1000ML BTL

## (undated) DEVICE — SUTURE PDS II 2-0 SH

## (undated) DEVICE — APPLICATOR COTTON TIP 3 10/PK

## (undated) DEVICE — SUTURE VICRYL 3-0 SH

## (undated) DEVICE — DRAIN CHANNEL 19FR BLAKE

## (undated) DEVICE — 3M™ IOBAN™ 2 ANTIMICROBIAL INCISE DRAPE 6648EZ: Brand: IOBAN™ 2

## (undated) DEVICE — Device: Brand: PLUMEPEN

## (undated) DEVICE — SUTURE CHROMIC GUT 4-0 FS-2

## (undated) DEVICE — 3M™ IOBAN™ 2 ANTIMICROBIAL INCISE DRAPE 6650EZ: Brand: IOBAN™ 2

## (undated) DEVICE — SUPER SPONGES,MEDIUM: Brand: KERLIX

## (undated) DEVICE — CAUTERY BLADE 2IN INS E1455

## (undated) DEVICE — GOWN,SIRUS,FABRIC-REINFORCED,LARGE: Brand: MEDLINE

## (undated) DEVICE — BANDAGE ROLL,100% COTTON, 6 PLY, LARGE: Brand: KERLIX

## (undated) DEVICE — PLASTIC BREAST CDS-LF: Brand: MEDLINE INDUSTRIES, INC.

## (undated) DEVICE — STANDARD HYPODERMIC NEEDLE,POLYPROPYLENE HUB: Brand: MONOJECT

## (undated) DEVICE — SUTURE MONOCRYL 3-0 PS-2

## (undated) DEVICE — DERMABOND LIQUID ADHESIVE

## (undated) DEVICE — VIOLET BRAIDED (POLYGLACTIN 910), SYNTHETIC ABSORBABLE SUTURE: Brand: COATED VICRYL

## (undated) DEVICE — DRAIN RELIAVAC 100CC

## (undated) DEVICE — PAD SACRAL SPAN AID

## (undated) DEVICE — LAPAROTOMY SPONGE - RF AND X-RAY DETECTABLE PRE-WASHED: Brand: SITUATE

## (undated) DEVICE — GLOVE SURG TRIUMPH SZ 6

## (undated) DEVICE — UNDYED BRAIDED (POLYGLACTIN 910), SYNTHETIC ABSORBABLE SUTURE: Brand: COATED VICRYL

## (undated) DEVICE — BRA ZIPPERED SYTLE 958 X-LG

## (undated) DEVICE — DRESSING BIOPATCH 1X4 CNTR

## (undated) DEVICE — SUTURE SILK 0 FSL

## (undated) NOTE — LETTER
[facility] Testing Department  Phone: [phone]  OUTSIDE TESTING RESULT REQUEST      TO:   DR Enrike Madsen Today's Date: 2/5/18    FAX #: [phone]     IMPORTANT: FOR YOUR IMMEDIATE ATTENTION  PLEASE FAX ALL RESULTS [phone]    Charo

## (undated) NOTE — ED AVS SNAPSHOT
Layne Ruiz   MRN: [id number]    Department:  [facility] Emergency Department   Date of Visit:  8/14/2017           Disclosure     Insurance plans vary and the physician(s) referred by the ER may not be covered by your plan.  Please contact y If you have been prescribed any medication(s), please fill your prescription right away and begin taking the medication(s) as directed    If the emergency physician has read X-rays, these will be re-interpreted by a radiologist.  If there is a significant